# Patient Record
(demographics unavailable — no encounter records)

---

## 2025-03-04 NOTE — ASSESSMENT
[FreeTextEntry1] :   The patient is a 66 y/o F w/ PMH DVT (2017; 2018) on Lovenox, umbilical hernia repair (s/p partial ileum resection in 2017), history of SBO (04/2019), and gastric pancreatic rest who presents for follow up.  #Gastric Pancreatic Rest  1.5 cm subepithelial lesion arising from submucosa, w/ endosonographic features of a benign gastric pancreatic rest. EUS in 2019 and 2021 w/ stable lesion. Patient asymptomatic at this time. - Risk/benefit discussion was had with patient has lesion does not require interval surveillance, patient wishes to hold off on further endoscopy/EUS as she is asmyptomatic - Patient counseled to return sooner should she experience abd pain, weight loss, nausea, vomiting.   #Colon Cancer Screening Last colonoscopy in 2019 w/ small ascending colon polyp, path +hyperplastic  - Repeat colonoscopy in 2029  discussed w/ Dr. Clemente Vaz

## 2025-03-04 NOTE — END OF VISIT
[] : Fellow [FreeTextEntry3] : As modified and discussed with patient MD OTILIA Guerrero [facility] Associate Professor of Medicine [facility] of [facility]

## 2025-03-04 NOTE — HISTORY OF PRESENT ILLNESS
[de-identified] : 6/19/2019 - nonbleeding gastric ulcer, biopsies + reactive gastropathy negative [facility] IM [FreeTextEntry1] : 6/9/2019 - small ascending colon polyp - sessile in appearance, path + hyperplastic  [de-identified] : 8/2/2019 - 1.5 cm subepithelial nodule found in the gastric antrum originating from layer 3 (submucosa consistent with pancreatic rest  2/5/2021 - stable 1.5 cm subepithelial lesion w/ central umbilication noted- stable appearance from 2019.